# Patient Record
Sex: MALE | Race: WHITE | NOT HISPANIC OR LATINO | Employment: FULL TIME | URBAN - NONMETROPOLITAN AREA
[De-identification: names, ages, dates, MRNs, and addresses within clinical notes are randomized per-mention and may not be internally consistent; named-entity substitution may affect disease eponyms.]

---

## 2024-06-26 ENCOUNTER — APPOINTMENT (OUTPATIENT)
Dept: SLEEP MEDICINE | Facility: CLINIC | Age: 64
End: 2024-06-26
Payer: COMMERCIAL

## 2024-06-27 ENCOUNTER — OFFICE VISIT (OUTPATIENT)
Dept: SLEEP MEDICINE | Facility: CLINIC | Age: 64
End: 2024-06-27
Payer: COMMERCIAL

## 2024-06-27 VITALS
WEIGHT: 257.3 LBS | DIASTOLIC BLOOD PRESSURE: 74 MMHG | BODY MASS INDEX: 36.84 KG/M2 | HEIGHT: 70 IN | SYSTOLIC BLOOD PRESSURE: 122 MMHG | HEART RATE: 78 BPM | OXYGEN SATURATION: 98 %

## 2024-06-27 DIAGNOSIS — G47.33 OSA (OBSTRUCTIVE SLEEP APNEA): Primary | ICD-10-CM

## 2024-06-27 DIAGNOSIS — G25.81 RESTLESS LEG SYNDROME: ICD-10-CM

## 2024-06-27 DIAGNOSIS — E66.09 CLASS 2 OBESITY DUE TO EXCESS CALORIES WITH BODY MASS INDEX (BMI) OF 36.0 TO 36.9 IN ADULT, UNSPECIFIED WHETHER SERIOUS COMORBIDITY PRESENT: ICD-10-CM

## 2024-06-27 PROCEDURE — 3008F BODY MASS INDEX DOCD: CPT | Performed by: PSYCHIATRY & NEUROLOGY

## 2024-06-27 PROCEDURE — 1036F TOBACCO NON-USER: CPT | Performed by: PSYCHIATRY & NEUROLOGY

## 2024-06-27 PROCEDURE — 99204 OFFICE O/P NEW MOD 45 MIN: CPT | Performed by: PSYCHIATRY & NEUROLOGY

## 2024-06-27 RX ORDER — LEVOTHYROXINE SODIUM 200 UG/1
200 TABLET ORAL DAILY
COMMUNITY

## 2024-06-27 RX ORDER — BUPROPION HYDROCHLORIDE 150 MG/1
150 TABLET ORAL EVERY MORNING
COMMUNITY
Start: 2024-06-24 | End: 2024-09-22

## 2024-06-27 RX ORDER — METOPROLOL TARTRATE 25 MG/1
25 TABLET, FILM COATED ORAL 2 TIMES DAILY
COMMUNITY
Start: 2024-03-18

## 2024-06-27 RX ORDER — METOLAZONE 5 MG/1
5 TABLET ORAL DAILY
COMMUNITY

## 2024-06-27 RX ORDER — PRAMIPEXOLE DIHYDROCHLORIDE 1 MG/1
2 TABLET ORAL NIGHTLY
COMMUNITY

## 2024-06-27 RX ORDER — METFORMIN HYDROCHLORIDE 500 MG/1
500 TABLET ORAL DAILY
COMMUNITY

## 2024-06-27 RX ORDER — OMEPRAZOLE 20 MG/1
20 CAPSULE, DELAYED RELEASE ORAL
COMMUNITY
Start: 2024-03-18

## 2024-06-27 RX ORDER — TADALAFIL 5 MG/1
5 TABLET ORAL DAILY
COMMUNITY

## 2024-06-27 RX ORDER — FUROSEMIDE 20 MG/1
20 TABLET ORAL
COMMUNITY
Start: 2024-05-07 | End: 2025-05-07

## 2024-06-27 RX ORDER — DEXTROAMPHETAMINE SACCHARATE, AMPHETAMINE ASPARTATE, DEXTROAMPHETAMINE SULFATE AND AMPHETAMINE SULFATE 5; 5; 5; 5 MG/1; MG/1; MG/1; MG/1
20 TABLET ORAL
COMMUNITY
Start: 2024-06-24 | End: 2024-07-24

## 2024-06-27 NOTE — PROGRESS NOTES
Patient: Todd Pearce    [id number]  : 1960 -- AGE 64 y.o.    Provider: Maurice Hua MD     [facility]   Service Date: 2024              [facility]  New Visit Note        The patient's referring provider is: No ref. provider found    HPI:  Todd Pearce is a 64 y.o. male with GRZEGORZ and RLS with PMH notable for GERD, hypothyroidism, depression, and anxiety, who presents today due to waking up in the night despite using CPAP.    He has had CPAP for about 2-3 months (Symone G3 device), but not in the last few weeks. Wakes up at baseline several times per night and does not stay asleep any better with CPAP. He is frustrated by CPAP because he is bothered by having to fiddle with the machine to reset the machine in the middle of the night, though no pressure intolerance. He is not intolerant of it, but is frustrated due to expecting to be able to sleep through the night with CPAP.    Wakes up tired. Loud snoring for years. Sleep is not restorative.    Using a hybrid full face mask (AirFit F30i medium), was sent a few sizes, and was sent a few nasal style masks. Find his current mask to be tolerable.    Sometimes has too much mist in the mask.    Asks about CPAP therapy alternatives. Willing to continue to practice using CPAP.    Informed today that to qualify for Inspire device he would need to get his weight down to 223 lbs to have a BMI at 32.     Taking Adderall 10 mg daily, which he states is for ADHD - it helps him organize his thoughts. Not taking for alerting effects. Has been on it since in his 40s.    NIGHTTIME SYMPTOMS:   Snoring: many years, loud, nightly, worse when supine, worsening over time and worsening with weight gain  Witnessed apnea: 15 years ago  Nocturnal gasping: No  Nocturnal choking: No  Sleep walking: No  Sleep talking:  No  Dream enactment: No  Nocturnal GERD: no  Morning headaches: no  Morning dry mouth/sore throat:  "occasional  Nocturia: 1-2x/night  Restless sleep: Yes  Sleep paralysis: No  Hypnagogic/hypnopompic hallucinations: No    DAYTIME SYMPTOMS  Elliott: 11/24  Insomnia severity index: 18/28  Sleepy upon waking in the morning    RLS symptoms: yes, feels antsy feeling in his legs without his medication, uncomfortable urge to move his legs, only at night, impairs ability to fall asleep (\"terrible\" without med), treated well with pramipexole 2 mg at bedtime, taking this for over 20 years. Takes hot bath at night to help with RLS. No anemia.    Cataplexy: No    SLEEP HABITS:   Bedtime: 10 pm  Wake time: 530 am    PRIOR SLEEP STUDIES:  PSG through [facility], 3/19/2024: Weight 117 kg, BMI 38.2.  Severe GRZEGORZ with AHI4% of 82.3/h due to 31 obstructive apneas and 361 hypopneas.  Mean SpO2 93%, russell 67% with 48.6 minutes below 90% and 40.1 minutes below 88%.  Wake supine ETCO2 43-44 mmHg, max 55 mmHg with 2.5% of sleep time above 50 and 0% above 55.  0 PLMS.  Patient met split criteria but was unable to tolerate PAP therapy and the remainder of testing was run as a diagnostic PSG.  -Report reviewed person by me today.     Reviewed sleep apnea severity with patient today.      Past Surgical History:   Procedure Laterality Date    INCISION AND DRAINAGE OF WOUND  11/27/2017    Incision And Drainage Of Skin Abscess    OTHER SURGICAL HISTORY  11/27/2017    Shoulder Surgery Left    SHOULDER SURGERY  11/27/2017    Shoulder Surgery Right     Current Outpatient Medications   Medication Sig Dispense Refill    amphetamine-dextroamphetamine (Adderall) 20 mg tablet Take 1 tablet (20 mg) by mouth once daily. Taking 10 mg daily      buPROPion XL (Wellbutrin XL) 150 mg 24 hr tablet Take 1 tablet (150 mg) by mouth once daily in the morning.      furosemide (Lasix) 20 mg tablet Take 1 tablet (20 mg) by mouth once daily.      levothyroxine (Synthroid, Levoxyl) 200 mcg tablet Take 1 tablet (200 mcg) by mouth once daily.      metFORMIN " "(Glucophage) 500 mg tablet Take 1 tablet (500 mg) by mouth once daily.      metOLazone (Zaroxolyn) 5 mg tablet Take 1 tablet (5 mg) by mouth once daily.      metoprolol tartrate (Lopressor) 25 mg tablet Take 1 tablet (25 mg) by mouth twice a day. Taking just 25 mg once daily      omeprazole (PriLOSEC) 20 mg DR capsule Take 1 capsule (20 mg) by mouth once daily.      pramipexole (Mirapex) 1 mg tablet Take 2 tablets (2 mg) by mouth once daily at bedtime.      tadalafil (Cialis) 5 mg tablet Take 1 tablet (5 mg) by mouth once daily.      tirzepatide, weight loss, (Zepbound) 2.5 mg/0.5 mL injection Inject 2.5 mg under the skin once a week.       No current facility-administered medications for this visit.     No Known Allergies    FAMILY HISTORY OF SLEEP DISORDERS: none that he knows of, but father may have had RLS      SOCIAL HISTORY  Employment: teacher  Lives with: wife  Alcohol: occasional beer  Cigarettes: no  Caffeine: 1/day     ROS: 12 point ROS positive for fatigue, wheezing, weight gain, and anxiety.  All other items/systems were reviewed and are negative.    PHYSICAL EXAMINATION:   Vitals:    06/27/24 0833   BP: 122/74   BP Location: Left arm   Patient Position: Sitting   BP Cuff Size: Adult   Pulse: 78   SpO2: 98%   Weight: 117 kg (257 lb 4.8 oz)   Height: 1.778 m (5' 10\")     Body mass index is 36.92 kg/m².  General: Awake. Alert. Comfortable. No apparent distress.   Speech: Normal  Comprehension: Normal  Mood: Stable  Affect: Appropriate  Eyes:   Eyelids: normal            ENT:          Nares  patent bilaterally. Septum deviation absent. Cunningham tongue position I. Tongue scalloping is not present, tongue is not enlarged, soft palate is not elongated, hard palate is not high arched. Uvula is not enlarged. Retrognathia is not present. Tonsils are not enlarged. Dentition very good.           Neck:          Circumference: moderately increased in caliber  Cardiac: Regular in rate and rhythm. No murmurs. No edema " in bilateral lower extremities.  Pul:         Clear to auscultation bilaterally. Normal respiratory effort   Abd:         obese  Neuro: Alert, well-oriented. Cranial nerves II-XII grossly normal and symmetric.  Moves all limbs symmetrically with no evidence of significant focal weakness. No abnormal movements noted. Normal gait      CPAP download:  DME: unknown  Date range: last 90 days  Days used: 17 days  Days used >4 hours: 0 days  Average usage (days used): 57 min  Settin-10 cm H2O  Pressure: 95th %ile 8.5 cm H2O, median 7.0 cm H2O  Leak: 11.5 L/min (95th %ile)  AHI: 3.0      LABS/DIAGNOSTICS:  Lab Results   Component Value Date    HGBA1C 5.0 2024   Labs from 24 include: Hemoglobin 16, creatinine 1.08, bicarb 23, TSH 1.5  Labs from 2024 include: Total testosterone 471 ng/dL, free testosterone 11.3 ng/dL      Echo 2023: Technically difficult due to body habitus.  Mild concentric LVH.  LV systolic function normal at 60±5% by visual estimation.  Grade 1 LV diastolic dysfunction    PFTs 2023: Normal spirometry.  No significant bronchodilator response.      ASSESSMENT AND PLAN: Mr. Todd Pearce is a 64 y.o. male with a history of GRZEGORZ on CPAP and RLS on pramipexole.  He is tolerating CPAP without issue, but he takes off his CPAP after approximate 1 hour of use when he wakes up in the night, though he is not bothered by CPAP.  RLS well-controlled with pramipexole.     #GRZEGORZ  -We discussed the risk factors for sleep apnea, pathophysiology of sleep apnea, treatment options, and potential long-term complications of untreated GRZEGORZ, including cardiovascular and metabolic complications.  -in office today we changed his pressure range to 4-15 cm h2O and lowered humidity setting to 3 from 4  -Informed patient that it is not uncommon to have occasional nocturnal awakenings with or without CPAP, particularly in his age group.  Advised patient to use CPAP all night, every night to get optimal  benefit    #RLS  -continue pramipexole 2 mg at bedtime, advised 1-2 hours before onset of symptoms  -check fasting iron/ferritin, check vit D    #obesity  -weight loss encouraged      All of the above was discussed with the patient in detail. He voiced an understanding of the above and was agreeable to proceed further as advised.     Around 53 minutes were spent on this encounter, including time reviewing the chart, conducting the H&P, counseling the patient, and documenting/placing orders.    FOLLOW UP:  1 month

## 2024-07-29 ENCOUNTER — OFFICE VISIT (OUTPATIENT)
Dept: SLEEP MEDICINE | Facility: CLINIC | Age: 64
End: 2024-07-29
Payer: COMMERCIAL

## 2024-07-29 VITALS
BODY MASS INDEX: 37.06 KG/M2 | WEIGHT: 258.9 LBS | HEART RATE: 86 BPM | SYSTOLIC BLOOD PRESSURE: 122 MMHG | DIASTOLIC BLOOD PRESSURE: 86 MMHG | HEIGHT: 70 IN | OXYGEN SATURATION: 97 %

## 2024-07-29 DIAGNOSIS — E66.09 CLASS 2 OBESITY DUE TO EXCESS CALORIES WITH BODY MASS INDEX (BMI) OF 36.0 TO 36.9 IN ADULT, UNSPECIFIED WHETHER SERIOUS COMORBIDITY PRESENT: ICD-10-CM

## 2024-07-29 DIAGNOSIS — G47.33 OSA (OBSTRUCTIVE SLEEP APNEA): Primary | ICD-10-CM

## 2024-07-29 DIAGNOSIS — G25.81 RESTLESS LEG SYNDROME: ICD-10-CM

## 2024-07-29 PROCEDURE — 3008F BODY MASS INDEX DOCD: CPT | Performed by: PSYCHIATRY & NEUROLOGY

## 2024-07-29 PROCEDURE — 99214 OFFICE O/P EST MOD 30 MIN: CPT | Performed by: PSYCHIATRY & NEUROLOGY

## 2024-07-29 PROCEDURE — 1036F TOBACCO NON-USER: CPT | Performed by: PSYCHIATRY & NEUROLOGY

## 2024-07-29 NOTE — PROGRESS NOTES
Patient: Todd Pearce    [id number]  : 1960 -- AGE 64 y.o.    Provider: Maurice Hua MD     [facility]   Service Date: 2024              [facility]  Follow-up Note        HPI: Todd Pearce is a 64 y.o. male with GRZEGORZ on CPAP and RLS on pramipexole with PMH notable for GERD, hypothyroidism, depression, and anxiety, who presented on 24 due to waking up in the night despite using CPAP after having started on PAP therapy a couple months prior, though he was not bothered by CPAP itself, but nightly average usage was only 57 minutes with residual AHI 3.0. He is here today for a follow up visit.     Continues to struggle with CPAP, but has only used it once, per the machine. Tried to use it while watching TV. Cannot stay asleep with CPAP. Wakes up after 45 min to an hour with CPAP. Feels the machine's air pressure changing when he moves in bed. He is uncomfortable in bed due to sciatica.    Nocturia about 2x/night.    Looking at alternatives to CPAP. Knows he needs to treat his GRZEGORZ. Willing to try again more diligently, but also wants to try the ULTepap nasal EPAP device. Tried on this device in clinic today and found it comfortable.    Trying to lose weight.     He has not yet gone to get his RLS labs drawn.    Continues to be tired in the daytime. Needs to be feeling less tired before school starts up again.    PAP DEVICE   Type: Symone 3G autoCPAP    MASK  Type: has both N30i and F3Oi, usually using the F30i    Prior Sleep studies:   PSG through [facility], 3/19/2024: Weight 117 kg, BMI 38.2.  Severe GRZEGORZ with AHI4% of 82.3/h due to 31 obstructive apneas and 361 hypopneas.  Mean SpO2 93%, russell 67% with 48.6 minutes below 90% and 40.1 minutes below 88%.  Wake supine ETCO2 43-44 mmHg, max 55 mmHg with 2.5% of sleep time above 50 and 0% above 55.  0 PLMS.  Patient met split criteria but was unable to tolerate PAP therapy and the remainder of  testing was run as a diagnostic PSG.               REVIEW OF MACHINE DOWNLOAD:   Date Range: last 30 days  % Days used: =3%  % Days with Usage >= 4 hrs: 0%  Average usage days used: 34 min   Settin-15 cmH2O  95th percentile pressure: 4.0 cmH2O, median pressure 4.0 cm H2O  Avg leak: 6.9 LPM  Residual AHI: 0.0      Past Surgical History:   Procedure Laterality Date    INCISION AND DRAINAGE OF WOUND  2017    Incision And Drainage Of Skin Abscess    KNEE SURGERY      OTHER SURGICAL HISTORY  2017    Shoulder Surgery Left    SHOULDER SURGERY  2017    Shoulder Surgery Right     Current Outpatient Medications on File Prior to Visit   Medication Sig Dispense Refill    amphetamine-dextroamphetamine (Adderall) 20 mg tablet Take 1 tablet (20 mg) by mouth once daily. Taking 10 mg daily      buPROPion XL (Wellbutrin XL) 150 mg 24 hr tablet Take 1 tablet (150 mg) by mouth once daily in the morning.      furosemide (Lasix) 20 mg tablet Take 1 tablet (20 mg) by mouth once daily.      levothyroxine (Synthroid, Levoxyl) 200 mcg tablet Take 1 tablet (200 mcg) by mouth once daily.      metFORMIN (Glucophage) 500 mg tablet Take 1 tablet (500 mg) by mouth once daily.      metOLazone (Zaroxolyn) 5 mg tablet Take 1 tablet (5 mg) by mouth once daily.      metoprolol tartrate (Lopressor) 25 mg tablet Take 1 tablet (25 mg) by mouth twice a day. Taking just 25 mg once daily      omeprazole (PriLOSEC) 20 mg DR capsule Take 1 capsule (20 mg) by mouth once daily.      pramipexole (Mirapex) 1 mg tablet Take 2 tablets (2 mg) by mouth once daily at bedtime.      tadalafil (Cialis) 5 mg tablet Take 1 tablet (5 mg) by mouth once daily.      tirzepatide, weight loss, (Zepbound) 2.5 mg/0.5 mL injection Inject 2.5 mg under the skin once a week.       No current facility-administered medications on file prior to visit.       PHYSICAL EXAMINATION:   Vitals:    24 1035   BP: 122/86   BP Location: Left arm   Patient Position:  "Sitting   BP Cuff Size: Adult   Pulse: 86   SpO2: 97%   Weight: 117 kg (258 lb 14.4 oz)   Height: 1.778 m (5' 10\")     Body mass index is 37.15 kg/m².   General: Awake. Alert. Comfortable. No apparent distress.   Speech: Normal.  Comprehension: Normal.  Mood: Stable.  Affect: Appropriate.  Pul:         Normal respiratory effort.   Abd:         Obese  Neuro: Alert, well-oriented. Cranial nerves II-XII grossly normal and symmetric.  Moves all limbs symmetrically with no evidence of significant focal weakness. No abnormal movements noted. Normal gait      ASSESSMENT AND PLAN: Mr. Todd Pearce is a 64 y.o. male with GRZEGORZ having difficulty keeping CPAP on all night with RLS (well-managed with pramipexole) and obesity. He is motivated to learn to acclimate to CPAP.      #GRZEGORZ  -pt motivated to try CPAP more diligently  -encouraged pt to use CPAP all night, every night. Advised pt that his GRZEGORZ is well treated with CPAP  -pt to consider the ULTepap device if he does not end up tolerating CPAP after a few more weeks of trying CPAP    #RLS  -pt was advised to get his blood work done - fasting iron/ferritin/vit D    #obesity  -pt working on weight loss    All of the above was discussed with the patient in detail. He voiced an understanding of the above and was agreeable to proceed further as advised.     32 minutes were spent with the patient plus time spent reviewing the chart, updating the chart as needed, and documenting.     FOLLOW UP: 2 weeks, per pt request  "

## 2024-07-30 ENCOUNTER — TELEPHONE (OUTPATIENT)
Dept: SLEEP MEDICINE | Facility: CLINIC | Age: 64
End: 2024-07-30
Payer: COMMERCIAL

## 2024-07-30 NOTE — TELEPHONE ENCOUNTER
Called pt back. He continues to struggle with CPAP. Last night could not fall asleep with it or stay asleep. Pressures were too low and too high. Took it off shortly after falling asleep and wife moved to other room due to his loud snoring.    We will have him try the nasal EPAP device, ULTepap.

## 2024-07-31 ENCOUNTER — APPOINTMENT (OUTPATIENT)
Dept: SLEEP MEDICINE | Facility: CLINIC | Age: 64
End: 2024-07-31
Payer: COMMERCIAL

## 2024-08-12 ENCOUNTER — APPOINTMENT (OUTPATIENT)
Dept: SLEEP MEDICINE | Facility: CLINIC | Age: 64
End: 2024-08-12
Payer: COMMERCIAL

## 2024-08-12 DIAGNOSIS — G25.81 RESTLESS LEG SYNDROME: ICD-10-CM

## 2024-08-12 DIAGNOSIS — G47.33 OSA (OBSTRUCTIVE SLEEP APNEA): Primary | ICD-10-CM

## 2024-08-12 DIAGNOSIS — E66.09 CLASS 2 OBESITY DUE TO EXCESS CALORIES WITH BODY MASS INDEX (BMI) OF 36.0 TO 36.9 IN ADULT, UNSPECIFIED WHETHER SERIOUS COMORBIDITY PRESENT: ICD-10-CM

## 2024-08-12 PROCEDURE — 99213 OFFICE O/P EST LOW 20 MIN: CPT | Performed by: PSYCHIATRY & NEUROLOGY

## 2024-08-12 NOTE — PROGRESS NOTES
Patient: Todd Pearce    [id number]  : 1960 -- AGE 64 y.o.    Provider: Maurice Hua MD     [facility]   Service Date: 2024              [facility]  Follow-up Note      Virtual or Telephone Consent  An interactive audio and video telecommunication system which permits real time communications between the patient (at the originating site) and provider (at the distant site) was utilized to provide this telehealth service.   Verbal consent was requested and obtained from Todd Pearce on this date, 24 for a telehealth visit.   I verified the patient's identity and physical location in [place].  If this is a new patient to me, I informed the patient of my name and type of active [place] license that I hold.      HPI: Todd Pearce is a 64 y.o. male with GRZEGORZ on CPAP and RLS managed with pramipexole with PMH notable for GERD, hypothyroidism, depression, and anxiety, who presented on 24 due to waking up in the night despite using CPAP after having started on PAP therapy a couple months prior, though he was not bothered by CPAP itself, but nightly average usage was only 57 minutes with residual AHI 3.0. He is here today for a follow up visit. He was last seen on 24, at which time he had interest in the ULTepap nasal EPAP device.    He called after the appointment to have us prescribe the ULTepap device, which we did. Today, he states that he really likes it. Using the larger size nasal cushions. Not able to sleep the whole night with it - often takes it off when he is dreaming and his breathing pattern changes. Typically getting around 3 hours of usage/night.    Getting up at night only about once/night. Sleeping better due to ULTepap device. Snoring is reduced.     Got a new pillow that keeps him from moving his head a lot like with his prior pillow, which helps keep his nasal device in place.    Seeing endo next week for weight loss  management. Not using tirzepatide.    Not using CPAP right now.    PAP DEVICE   Type: Symone 3G autoCPAP     RLS: starting to get more bothersome affecting his sleep. Has been taking pramipexole 2 mg nightly. Did not go to get his iron/vit D labs drawn.      Prior Sleep studies:   PSG through [facility], 3/19/2024: Weight 117 kg, BMI 38.2. Severe GRZEGORZ with AHI4% of 82.3/h due to 31 obstructive apneas and 361 hypopneas. Mean SpO2 93%, russell 67% with 48.6 minutes below 90% and 40.1 minutes below 88%. Wake supine ETCO2 43-44 mmHg, max 55 mmHg with 2.5% of sleep time above 50 and 0% above 55. 0 PLMS. Patient met split criteria but was unable to tolerate PAP therapy and the remainder of testing was run as a diagnostic PSG.             There is no problem list on file for this patient.    No past medical history on file.  Past Surgical History:   Procedure Laterality Date    INCISION AND DRAINAGE OF WOUND  11/27/2017    Incision And Drainage Of Skin Abscess    KNEE SURGERY      OTHER SURGICAL HISTORY  11/27/2017    Shoulder Surgery Left    SHOULDER SURGERY  11/27/2017    Shoulder Surgery Right     Current Outpatient Medications on File Prior to Visit   Medication Sig Dispense Refill    amphetamine-dextroamphetamine (Adderall) 20 mg tablet Take 1 tablet (20 mg) by mouth once daily. Taking 10 mg daily      buPROPion XL (Wellbutrin XL) 150 mg 24 hr tablet Take 1 tablet (150 mg) by mouth once daily in the morning.      furosemide (Lasix) 20 mg tablet Take 1 tablet (20 mg) by mouth once daily.      levothyroxine (Synthroid, Levoxyl) 200 mcg tablet Take 1 tablet (200 mcg) by mouth once daily.      metFORMIN (Glucophage) 500 mg tablet Take 1 tablet (500 mg) by mouth once daily.      metOLazone (Zaroxolyn) 5 mg tablet Take 1 tablet (5 mg) by mouth once daily.      metoprolol tartrate (Lopressor) 25 mg tablet Take 1 tablet (25 mg) by mouth twice a day. Taking just 25 mg once daily      omeprazole (PriLOSEC) 20 mg DR capsule  "Take 1 capsule (20 mg) by mouth once daily.      pramipexole (Mirapex) 1 mg tablet Take 2 tablets (2 mg) by mouth once daily at bedtime.      tadalafil (Cialis) 5 mg tablet Take 1 tablet (5 mg) by mouth once daily.      tirzepatide, weight loss, (Zepbound) 2.5 mg/0.5 mL injection Inject 2.5 mg under the skin once a week.       No current facility-administered medications on file prior to visit.       PHYSICAL EXAMINATION:   General: Awake. Alert. Comfortable. No apparent distress.   Speech: Normal.  Comprehension: Normal.  Mood: Stable.  Affect: Appropriate.  Pul:         Normal respiratory effort.   Neuro: Alert, well-oriented. Cranial nerves II-XII grossly normal and symmetric.      ASSESSMENT AND PLAN: Mr. Todd Pearce is a 64 y.o. male with severe GRZEGORZ who does not tolerate CPAP and he is adjusting to sleeping with the nasal EPAP device \"ULTepap\". We had limited time to discuss his RLS, which is not fully controlled. He has not gone to get his lab work drawn that I previously ordered to work-up his RLS.    #GRZEGORZ  -HST to be done with nasal EPAP device, ULTepap  - to assess therapeutic effectiveness    #RLS  -pt to get fasting iron/vit D levels drawn  -pt taking pramipexole 2 mg at bedtime  -did not have sufficient time today to thoroughly discuss his RLS     #obesity  -weight loss encouraged; pt working with endocrinology    All of the above was discussed with the patient in detail. He voiced an understanding of the above and was agreeable to proceed further as advised.     23 minutes were spent with the patient plus time spent reviewing the chart, updating the chart as needed, and documenting.     FOLLOW UP: after sleep study  "

## 2024-08-16 ENCOUNTER — LAB (OUTPATIENT)
Dept: LAB | Facility: LAB | Age: 64
End: 2024-08-16
Payer: COMMERCIAL

## 2024-08-16 DIAGNOSIS — G25.81 RESTLESS LEG SYNDROME: ICD-10-CM

## 2024-08-16 LAB
25(OH)D3 SERPL-MCNC: 52 NG/ML (ref 30–100)
FERRITIN SERPL-MCNC: 106 NG/ML (ref 20–300)
IRON SATN MFR SERPL: 33 % (ref 25–45)
IRON SERPL-MCNC: 106 UG/DL (ref 35–150)
TIBC SERPL-MCNC: 324 UG/DL (ref 240–445)
UIBC SERPL-MCNC: 218 UG/DL (ref 110–370)

## 2024-08-16 PROCEDURE — 82306 VITAMIN D 25 HYDROXY: CPT

## 2024-08-16 PROCEDURE — 82728 ASSAY OF FERRITIN: CPT

## 2024-08-16 PROCEDURE — 36415 COLL VENOUS BLD VENIPUNCTURE: CPT

## 2024-08-16 PROCEDURE — 83540 ASSAY OF IRON: CPT

## 2024-08-16 PROCEDURE — 83550 IRON BINDING TEST: CPT

## 2024-09-16 ENCOUNTER — TELEPHONE (OUTPATIENT)
Dept: SLEEP MEDICINE | Facility: HOSPITAL | Age: 64
End: 2024-09-16
Payer: COMMERCIAL

## 2024-09-16 NOTE — TELEPHONE ENCOUNTER
Patient was contacted by [facility] to have home sleep study shipped to his home. Patient has informed them that he is going another directions and wants to cancel the order.     Please be advised.     Michelle

## 2024-09-18 ENCOUNTER — HOSPITAL ENCOUNTER (OUTPATIENT)
Dept: CARDIOLOGY | Facility: HOSPITAL | Age: 64
Discharge: HOME | End: 2024-09-18
Payer: COMMERCIAL

## 2024-09-18 DIAGNOSIS — I48.91 ATRIAL FIBRILLATION, UNSPECIFIED TYPE (MULTI): ICD-10-CM

## 2024-09-18 PROCEDURE — 93246 EXT ECG>7D<15D RECORDING: CPT

## 2024-09-19 ENCOUNTER — APPOINTMENT (OUTPATIENT)
Dept: CARDIOLOGY | Facility: HOSPITAL | Age: 64
End: 2024-09-19
Payer: COMMERCIAL

## 2024-09-27 ENCOUNTER — TELEPHONE (OUTPATIENT)
Dept: SLEEP MEDICINE | Facility: CLINIC | Age: 64
End: 2024-09-27
Payer: COMMERCIAL

## 2024-09-27 NOTE — TELEPHONE ENCOUNTER
I'm assuming they are just referral forms for us to sign, so please have them fax over the forms and we can send them back. Thanks!

## 2024-10-22 DIAGNOSIS — G47.33 OSA (OBSTRUCTIVE SLEEP APNEA): Primary | ICD-10-CM

## 2024-10-30 ENCOUNTER — APPOINTMENT (OUTPATIENT)
Dept: UROLOGY | Facility: CLINIC | Age: 64
End: 2024-10-30
Payer: COMMERCIAL

## 2024-10-30 VITALS — TEMPERATURE: 97.1 F

## 2024-10-30 DIAGNOSIS — N52.9 ERECTILE DYSFUNCTION, UNSPECIFIED ERECTILE DYSFUNCTION TYPE: ICD-10-CM

## 2024-10-30 PROCEDURE — 99204 OFFICE O/P NEW MOD 45 MIN: CPT | Performed by: UROLOGY

## 2024-10-30 PROCEDURE — 1036F TOBACCO NON-USER: CPT | Performed by: UROLOGY

## 2024-10-30 RX ORDER — METHYLPREDNISOLONE 4 MG/1
TABLET ORAL
COMMUNITY
Start: 2024-08-26

## 2024-10-30 RX ORDER — GABAPENTIN 300 MG/1
CAPSULE ORAL
COMMUNITY
Start: 2024-10-24

## 2024-10-30 RX ORDER — MELOXICAM 15 MG/1
15 TABLET ORAL
COMMUNITY
Start: 2024-09-04

## 2024-10-30 RX ORDER — VIT C/E/ZN/COPPR/LUTEIN/ZEAXAN 250MG-90MG
1 CAPSULE ORAL
COMMUNITY
Start: 2024-03-19

## 2024-10-30 ASSESSMENT — PAIN SCALES - GENERAL: PAINLEVEL_OUTOF10: 0-NO PAIN

## 2024-11-11 NOTE — PROGRESS NOTES
Procedure:  Intracavernosal injection   Penile ultrasound/gray scale   Penile duplex Doppler ultrasound     Indication: Erectile dysfunction refractory to PDE5 inhibitors    Penile Ultrasound: Morphologic and gray scale evaluation of the penis     1.- Tunical integrity:    Normal: smooth, continuous with thickness < 2 mm     2.- Vascular integrity:     Grade 2: smooth vessel wall, but with short interruptions     3.- Erectile tissue integrity:   Calcified lesions: coalesced hyperechoic with back shadowing     Medication:     15 units of mixture # 5      Penile rigidity:  25%   Penile curvature: none   Phenylephrine: none     Penile duplex Doppler ultrasound:             Right   Left   Cavernosal artery diameters:     0.59 mm                0.7 mm   Peak systolic velocities:  23.2 cm/sec   23.2 cm/sec    End diastolic velocities:  7.42 cm/sec   8.02 cm/sec       Impression: Severe erectile dysfunction secondary to arterial insufficiency and corporo veno-occlusive dysfunction refractory to medical management with PDE's (Viagra, Cialis) .      Plan:     Intracavernosal therapy. The patient was instructed how to self inject inject intracavernosal therapy and instructed to start injecting 10 units of mixture 9. He may titrate the dose up or down by 5 units to maximize penile rigidity and duration of erection.      The patient was also counseled extensively that today or while on injections If he develops a priapism / erection over 4 hours he was ask to return to the office or to the emergency room immediately. Educational material was provided and all his questions and concerns were addressed.

## 2024-11-11 NOTE — PROGRESS NOTES
"HPI:  64 y.o. yo male patient complains of erectile dysfunction    Last Visit:  Will schedule intracavernosal injection and penile duplex ultrasound.     Fu for Doppler    Today's Visit  - Doppler completed today (see separate note)   -Injection teaching done today  - Penile rigidity - 5/10  - Taking Cialis 5mg daily   - Interested in intracavernosal injection    -cardiology and PCP note reviewed    Labs:  Testosterone, Free 2/25/24: 11.3, total 471  CBC: 4/8/24  Component  Ref Range & Units 6 mo ago   WBC  3.70 - 11.00 k/uL 9.36   RBC  4.20 - 6.00 m/uL 5.12   Hemoglobin  13.0 - 17.0 g/dL 16.0   Hematocrit  39.0 - 51.0 % 47.4   MCV  80.0 - 100.0 fL 92.6   MCH  26.0 - 34.0 pg 31.3   MCHC  30.5 - 36.0 g/dL 33.8   RDW-CV  11.5 - 15.0 % 12.5   Platelet Count  150 - 400 k/uL 315   MPV  9.0 - 12.7 fL 9.0   Neutrophils %  % 64.1   Abs Neut  1.45 - 7.50 k/uL 6.00   Lymphocytes %  % 23.9   Abs Lymph  1.00 - 4.00 k/uL 2.24   Monocytes %  % 10.3   Abs Mono  <0.87 k/uL 0.96 High    Eosinophils %  % 1.2   Abs Eosin  <0.46 k/uL 0.11   Basophils %  % 0.2   Abs Baso  <0.11 k/uL <0.03   Immature Granulocytes %  % 0.3   Abs Immature Gran  <0.10 k/uL 0.03   NRBC  /100 WBC 0.0   Absolute nRBC  <0.01 k/uL <0.01     No results found for: \"PROLACTIN\"  Lab Results   Component Value Date    HGBA1C 5.0 04/08/2024     CMP: 4/8/24  Component  Ref Range & Units 6 mo ago   Protein, Total  6.3 - 8.0 g/dL 7.6   Albumin  3.9 - 4.9 g/dL 4.3   Calcium, Total  8.5 - 10.2 mg/dL 9.0   Bilirubin, Total  0.2 - 1.3 mg/dL 0.6   Alkaline Phosphatase  38 - 113 U/L 86   AST  14 - 40 U/L 21   ALT  10 - 54 U/L 21   Glucose  74 - 99 mg/dL 88     BUN  9 - 24 mg/dL 20   Creatinine  0.73 - 1.22 mg/dL 1.08   Sodium  136 - 144 mmol/L 137   Potassium  3.7 - 5.1 mmol/L 3.7   Chloride  97 - 105 mmol/L 103   CO2  22 - 30 mmol/L 23   Anion Gap  9 - 18 mmol/L 11   Estimated Glomerular Filtration Rate  >=60 mL/min/1.73m² 77      in high school at " [employer]    PMH:  No past medical history on file.     PSH:  Past Surgical History:   Procedure Laterality Date    INCISION AND DRAINAGE OF WOUND  11/27/2017    Incision And Drainage Of Skin Abscess    KNEE SURGERY      OTHER SURGICAL HISTORY  11/27/2017    Shoulder Surgery Left    SHOULDER SURGERY  11/27/2017    Shoulder Surgery Right        Medications:    Current Outpatient Medications:     amphetamine-dextroamphetamine (Adderall) 20 mg tablet, Take 1 tablet (20 mg) by mouth once daily. Taking 10 mg daily, Disp: , Rfl:     buPROPion XL (Wellbutrin XL) 150 mg 24 hr tablet, Take 1 tablet (150 mg) by mouth once daily in the morning., Disp: , Rfl:     cholecalciferol, vitamin D3, 250 mcg (10,000 unit) capsule, Take 1 capsule (250 mcg) by mouth early in the morning.., Disp: , Rfl:     furosemide (Lasix) 20 mg tablet, Take 1 tablet (20 mg) by mouth once daily., Disp: , Rfl:     gabapentin (Neurontin) 300 mg capsule, TAKE ONE CAPSULE BY MOUTH 1 hour before BEDTIME, Disp: , Rfl:     levothyroxine (Synthroid, Levoxyl) 200 mcg tablet, Take 1 tablet (200 mcg) by mouth once daily., Disp: , Rfl:     meloxicam (Mobic) 15 mg tablet, Take 1 tablet (15 mg) by mouth once daily., Disp: , Rfl:     metFORMIN (Glucophage) 500 mg tablet, Take 1 tablet (500 mg) by mouth once daily., Disp: , Rfl:     methylPREDNISolone (Medrol Dospak) 4 mg tablets, TAKE BY MOUTH AS INSTRUCTED PER PACKAGE, Disp: , Rfl:     metOLazone (Zaroxolyn) 5 mg tablet, Take 1 tablet (5 mg) by mouth once daily., Disp: , Rfl:     metoprolol tartrate (Lopressor) 25 mg tablet, Take 1 tablet (25 mg) by mouth twice a day. Taking just 25 mg once daily, Disp: , Rfl:     omeprazole (PriLOSEC) 20 mg DR capsule, Take 1 capsule (20 mg) by mouth once daily., Disp: , Rfl:     pramipexole (Mirapex) 1 mg tablet, Take 2 tablets (2 mg) by mouth once daily at bedtime., Disp: , Rfl:     tadalafil (Cialis) 5 mg tablet, Take 1 tablet (5 mg) by mouth once daily., Disp: , Rfl:      tirzepatide, weight loss, (Zepbound) 2.5 mg/0.5 mL injection, Inject 2.5 mg under the skin once a week., Disp: , Rfl:     Allergy:  No Known Allergies     Exam  Testicles descended bilaterally, nontender, no masses  Vasa palpable bilaterally  Penis circ'd, no lesions, no plaques    Assessment/Plan  #Erectile Dysfunction: I discussed the etiology and different treatment modalities for erectile dysfunction. Specifically, we talked about lifestyle factors like smoking, diet, and exercise. We also discussed ED as a sign of systemic disease, and the contributions of elevated cholesterol and elevated blood sugars on erections. We then discussed treatment modalities, specifically PDE5 inhibitors, intracavernosal injections, vacuum erectile devices, and penile prostheses.    -start intra cavernosal injections with mix 9 at home, starting at 10 units -   - Doppler completed today  - Continue Cialis 5 mg     Fu in 3 months    Scribe Attestation  By signing my name below, I, Francine Chi   attest that this documentation has been prepared under the direction and in the presence of Delfin Bell MD.    Scribe Attestation  By signing my name below, IElin, Scribosmin   attest that this documentation has been prepared under the direction and in the presence of Delfin Bell MD.

## 2024-11-12 ENCOUNTER — APPOINTMENT (OUTPATIENT)
Dept: UROLOGY | Facility: CLINIC | Age: 64
End: 2024-11-12
Payer: COMMERCIAL

## 2024-11-12 VITALS
HEART RATE: 79 BPM | DIASTOLIC BLOOD PRESSURE: 77 MMHG | BODY MASS INDEX: 38.25 KG/M2 | TEMPERATURE: 97.7 F | SYSTOLIC BLOOD PRESSURE: 116 MMHG | HEIGHT: 70 IN | WEIGHT: 267.2 LBS

## 2024-11-12 DIAGNOSIS — N52.9 ERECTILE DISORDER: Primary | ICD-10-CM

## 2024-11-12 PROCEDURE — 54235 NJX CORPORA CAVERNOSA RX AGT: CPT | Performed by: UROLOGY

## 2024-11-12 PROCEDURE — 93980 PENILE VASCULAR STUDY: CPT | Performed by: UROLOGY

## 2024-11-12 PROCEDURE — 99214 OFFICE O/P EST MOD 30 MIN: CPT | Performed by: UROLOGY

## 2024-11-12 ASSESSMENT — PAIN SCALES - GENERAL: PAINLEVEL_OUTOF10: 0-NO PAIN

## 2025-04-16 NOTE — PROGRESS NOTES
"HISTORY OF PRESENT ILLNESS:  Mr. Pearce is a 64 y.o. male referred by Dr. Peñaloza for a family history of early-onset Alzheimer's/dementia.  He is accompanied to his virtual genetics visit alone.    GENETIC TESTING:  No     MEDICAL CONCERNS:  GERD  Hypothyroid  Paroxysmal atrial fibrillation  Presented to the emergency department February 2024 for abdominal pain and was found to to be in A-fib with RVR   Sleep apnea-- has mouth piece now and it is helping  Osteoarthritis   Tore meniscus on right knee (in 9/2024)  Restless leg syndrome  Anxiety    RECENT SPECIALISTS (within the past couple of years):  8/12/24-- sleep medicine-- seen for GRZEGORZ on CPAP and RLS managed with pramipexole-- does not tolerate CPAP and he is adjusting to sleeping with the nasal EPAP device \"ULTepap\"--- HST to be done with nasal EPAP device, ULTepap  - to assess therapeutic effectiveness-- get fasting iron/vit D levels draw-- taking pramipexole-- weight loss encouraged; pt working with endocrinology-- FUP after sleep study    10/30/24-- urology-- seen for erectile dysfunction-- will schedule intracavernosal injection and penile duplex ultrasound-- FUP after doppler    11/7/24-- cardiology-- hx of atrial fibrillation and sleep apnea-- restart metoprolol succinate-- PSG completed on 3/19/2024 showed severe GRZEGORZ, not using CPAP machine, going to see a dentist for mouth guard-- was referred to endocrinology for medical weight loss, encouraged to increase physical activity using water aerobics and swimming-- discontinue lasix-- RTC 6 months    3/14/25-- orthopedic surgery-- hx of left knee osteoarthritis, complex medial meniscus tear and prepatellar bursitis-- first will treat bursitis-- will place on 2 week course of Naproxen and regular icing. 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 It is unknown if he has had genetic testing.  Mother had borderline personality disorder. She passed away at 82.  Maternal grandfather passed away in his 50's from a fall.    Father was diagnosed with Alzheimer's at 62. He passed away at 72. It is unknown if he has had genetic testing.  Paternal uncle, 79 years old, was diagnosed with Alzheimer's disease in his late-60's.  Paternal aunt, 80's, possibly has Alzheimer's-- she has not been officially diagnosed.  Paternal aunt was diagnosed with cancer around age 49 (type of cancer is unknown). She passed away at 50.  Paternal grandmother passed away in her 80's from a 'white blood cell issue.'  Paternal first cousin had intellectual disability (unable to live alone)-- he passed away in his 40's.  Paternal first cousin, female, passed away before the age of 2 from a 'blood issue.'    Consanguinity and Ashkenazi [place]anda[place] ancestry were denied. The patient's maternal side is of [place] descent, and the patient's paternal side is of [place] descent. The remainder of the family history was negative for intellectual disability, birth defects, miscarriages/stillbirths, blindness, deafness, kidney disease, heart disease, cancer, muscle disease, and blood disorders.     DISCUSSION:  Mr. Pearce is a 64 y.o. male referred by Dr. Peñaloza for a family history of early-onset Alzheimer's/dementia.  We discussed the following:    Genetic evaluation is NOT a tool to diagnose dementia.  Dementia is a term that describes a group of symptoms associated with cognitive and/or behavioral changes that interfere with the ability to perform everyday activities.   Alzheimer's disease (AD) and frontotemporal dementia (FTD) are the two most common types of dementia.     Every person has  a baseline 10-12% risk to develop Alzheimer's during their lifetime.   Factors such as family history and diet, exercise, drinking, and smoking habits can increase that number.    A recent study showed that having any first-degree relative with Alzheimer's significantly increased risk (PMID: 39142740):  >=2 first-degree relatives: RR 3.98   Mr. Vo risk to develop Alzheimer's is most likely higher than RR 3.98 since he also has one (possibly 2) second degree relatives with Alzheimer's.    Early-onset AD (before age 65) accounts for ~5% of all cases of AD.  3 genes are associated with EOAD (early onset AD)  JEANINE, PSEN1, or PSEN2 (PMID: 65092271).   PSEN1 is the most common cause of ADAD (autosomal dominant Alzheimer's disease)  PSEN2  JEANINE  These genes have a high penetrance-- the lifetime risk of Alzheimer's when having a mutation in one of these 3 genes is as high as 100%.   The molecular diagnostic yield has been reported to be about 5-15% of individuals with EOAD.    We discussed Genetic Information Non-discrimination Act (CLARA) as it relates to pre-symptomatic testing for the 3 early-onset AD genes.  CLARA prohibits discrimination by health insurance plans and employers based on one's genetic information.  CLARA does not apply to life, long-term care or disability insurance. These insurers are allowed to use genetic, personal or family health information to make coverage or premium decisions.   Some states have their own genetic protection laws, providing additional security against genetic discrimination for these types of insurance.  In addition, CLARA does not apply to:  Members of the [place]   Veterans obtaining health care through the 's Administration  Individuals using the  Health Service, or  Federal employees enrolled in the Federal Employees Health Benefits program (FEHB)    Given his family history of early-onset Alzheimer's, genetic testing of these three genes is  clinically indicated.  We reviewed the limitations of testing an unaffected individual for Alzheimer's disease.  When testing an individual who has not had a diagnosis of Alzheimer's disease, like Mr. Pearce, a negative test result may have limited utility in defining future risks to develop Alzheimer's, as it is unclear whether the affected family members had a mutation in one of the genes that the unaffected individual did not inherit, or alternatively, whether there is another untested risk factor to explain the family history of Alzheimers that is shared between the unaffected individual and their family members.     We discussed that it would be helpful to see if his dad and/or brother had genetic testing for the EOAD genes.   If they did have testing, and were negative, there would be no reason to test Mr. Pearce.  If they did have testing, and were positive, then single site genetic testing can be pursued for Mr. Pearce.    He is unsure if his father had genetic testing for the EOAD genes. He will request his father's medical records (he was his father's POA).  He will speak to his brother's wife to see if his brother had genetic testing for the EOAD genes.    We discussed that if he wanted to proceed with this genetic testing, that he would need to be cleared by psychiatry.  This is required because the testing process and results (if positive or if an uncertain result is identified) can be stressful.

## 2025-04-17 ENCOUNTER — TELEMEDICINE CLINICAL SUPPORT (OUTPATIENT)
Dept: GENETICS | Facility: CLINIC | Age: 65
End: 2025-04-17
Payer: COMMERCIAL

## 2025-04-17 DIAGNOSIS — Z81.8 FAMILY HISTORY OF PRESENILE DEMENTIA: ICD-10-CM

## 2025-04-17 PROCEDURE — 96041 GENETIC COUNSELING SVC EA 30: CPT | Performed by: GENETIC COUNSELOR, MS

## 2025-04-17 ASSESSMENT — ENCOUNTER SYMPTOMS
NEUROLOGIC COMPLAINT: 1
MEMORY LOSS: 1
FATIGUE: 1

## 2025-04-17 NOTE — PATIENT INSTRUCTIONS
PLAN:  He is unsure if his father had genetic testing for the EOAD genes. He will request his father's medical records (he was his father's POA).  He will speak to his brother's wife to see if his brother had genetic testing for the EOAD genes.  He will call and let me know after he receives his father's medical records and after he speaks to his brother's wife.  Please contact me with any questions.    Total time spent on day of encounter: 70 minutes (40 minutes with patient, 30 minutes on pre/post patient care activities, including documentation).    Nevaeh Stevenson [facility]  Certified Genetic Counselor  [facility]  [phone]    Reviewed by:  Dr. Sumaya Rodriguez  Clinical   [facility]  [phone]

## 2025-04-21 ENCOUNTER — TELEPHONE (OUTPATIENT)
Dept: GENETICS | Facility: CLINIC | Age: 65
End: 2025-04-21
Payer: COMMERCIAL

## 2025-04-21 NOTE — TELEPHONE ENCOUNTER
I returned the patient's call.  He stated that his brother's wife (the brother who was diagnosed with dementia at 56) said that his brother has not had genetic testing.  Mr. Pearce stated that his father passed away ~19 years ago and knows that no genetic testing was ordered from him.    His brother and his wife live in [place]. We discussed that his brother, who was diagnosed with dementia at 56, would be the best person to undergo genetic testing first. If a mutation is found, then we can test Mr. Pearce. If no mutations are found, there would not be a reason to test Mr. Pearce.  Mr. Pearce stated that he will relay this information to his brother's wife and that he will let me know once his brother receives his genetic test results.     Nevaeh Stevenson MS, [place]  Certified Genetic Counselor  [facility]  [phone]

## 2025-04-22 NOTE — PROGRESS NOTES
"Virtual or Telephone Consent    An interactive audio and video telecommunication system which permits real time communications between the patient (at the originating site) and provider (at the distant site) was utilized to provide this telehealth service.   Verbal consent was requested and obtained from Todd Pearce on this date, 04/23/25 for a telehealth visit and the patient's location was confirmed at the time of the visit.    HPI:  64 y.o. yo male patient complains of erectile dysfunction    Last Visit: 11/12/24  -start intra cavernosal injections with mix 9 at home, starting at 10 units -   - Doppler completed today  - Continue Cialis 5 mg   Fu in 3 months    Today's Visit    #ED  - did not start ICI mix 9 because missed call from pharmacy (does not  unknown numbers)  -Cialis 5mg not working anymore    Labs:  Testosterone, Free 2/25/24: 11.3, total 471  Magnesium  1.7 - 2.3 mg/dL 2.2     CBC: 4/8/24  Component  Ref Range & Units 6 mo ago   WBC  3.70 - 11.00 k/uL 9.36   RBC  4.20 - 6.00 m/uL 5.12   Hemoglobin  13.0 - 17.0 g/dL 16.0   Hematocrit  39.0 - 51.0 % 47.4   MCV  80.0 - 100.0 fL 92.6   MCH  26.0 - 34.0 pg 31.3   MCHC  30.5 - 36.0 g/dL 33.8   RDW-CV  11.5 - 15.0 % 12.5   Platelet Count  150 - 400 k/uL 315   MPV  9.0 - 12.7 fL 9.0   Neutrophils %  % 64.1   Abs Neut  1.45 - 7.50 k/uL 6.00   Lymphocytes %  % 23.9   Abs Lymph  1.00 - 4.00 k/uL 2.24   Monocytes %  % 10.3   Abs Mono  <0.87 k/uL 0.96 High    Eosinophils %  % 1.2   Abs Eosin  <0.46 k/uL 0.11   Basophils %  % 0.2   Abs Baso  <0.11 k/uL <0.03   Immature Granulocytes %  % 0.3   Abs Immature Gran  <0.10 k/uL 0.03   NRBC  /100 WBC 0.0   Absolute nRBC  <0.01 k/uL <0.01     No results found for: \"PROLACTIN\"  Lab Results   Component Value Date    HGBA1C 5.3 11/12/2024     CMP: 4/8/24  Component  Ref Range & Units 6 mo ago   Protein, Total  6.3 - 8.0 g/dL 7.6   Albumin  3.9 - 4.9 g/dL 4.3   Calcium, Total  8.5 - 10.2 mg/dL 9.0   Bilirubin, " Total  0.2 - 1.3 mg/dL 0.6   Alkaline Phosphatase  38 - 113 U/L 86   AST  14 - 40 U/L 21   ALT  10 - 54 U/L 21   Glucose  74 - 99 mg/dL 88     BUN  9 - 24 mg/dL 20   Creatinine  0.73 - 1.22 mg/dL 1.08   Sodium  136 - 144 mmol/L 137   Potassium  3.7 - 5.1 mmol/L 3.7   Chloride  97 - 105 mmol/L 103   CO2  22 - 30 mmol/L 23   Anion Gap  9 - 18 mmol/L 11   Estimated Glomerular Filtration Rate  >=60 mL/min/1.73m² 77     Cholesterol, Total  <200 mg/dL 154      Triglyceride  <150 mg/dL 55      HDL Cholesterol  >39 mg/dL 43      Non HDL Cholesterol  <130 mg/dL 111      Fasting Time  hrs 15   VLDL Cholesterol  <30 mg/dL 11   TC:HDL Ratio  <5.10 3.58   LDL Cholesterol  <100 mg/dL 100 High       LDL:HDL Ratio  <2.54 2.33      in high school at [employer]    PMH:  No past medical history on file.     PSH:  Past Surgical History:   Procedure Laterality Date    INCISION AND DRAINAGE OF WOUND  11/27/2017    Incision And Drainage Of Skin Abscess    KNEE SURGERY      OTHER SURGICAL HISTORY  11/27/2017    Shoulder Surgery Left    SHOULDER SURGERY  11/27/2017    Shoulder Surgery Right        Medications:    Current Outpatient Medications:     amphetamine-dextroamphetamine (Adderall) 20 mg tablet, Take 1 tablet (20 mg) by mouth once daily. Taking 10 mg daily, Disp: , Rfl:     buPROPion XL (Wellbutrin XL) 150 mg 24 hr tablet, Take 1 tablet (150 mg) by mouth once daily in the morning., Disp: , Rfl:     cholecalciferol, vitamin D3, 250 mcg (10,000 unit) capsule, Take 1 capsule (250 mcg) by mouth early in the morning.., Disp: , Rfl:     furosemide (Lasix) 20 mg tablet, Take 1 tablet (20 mg) by mouth once daily., Disp: , Rfl:     gabapentin (Neurontin) 300 mg capsule, TAKE ONE CAPSULE BY MOUTH 1 hour before BEDTIME, Disp: , Rfl:     levothyroxine (Synthroid, Levoxyl) 200 mcg tablet, Take 1 tablet (200 mcg) by mouth once daily., Disp: , Rfl:     meloxicam (Mobic) 15 mg tablet, Take 1 tablet (15 mg) by mouth once daily., Disp: , Rfl:      metFORMIN (Glucophage) 500 mg tablet, Take 1 tablet (500 mg) by mouth once daily., Disp: , Rfl:     methylPREDNISolone (Medrol Dospak) 4 mg tablets, TAKE BY MOUTH AS INSTRUCTED PER PACKAGE, Disp: , Rfl:     metOLazone (Zaroxolyn) 5 mg tablet, Take 1 tablet (5 mg) by mouth once daily., Disp: , Rfl:     metoprolol tartrate (Lopressor) 25 mg tablet, Take 1 tablet (25 mg) by mouth twice a day. Taking just 25 mg once daily, Disp: , Rfl:     omeprazole (PriLOSEC) 20 mg DR capsule, Take 1 capsule (20 mg) by mouth once daily., Disp: , Rfl:     pramipexole (Mirapex) 1 mg tablet, Take 2 tablets (2 mg) by mouth once daily at bedtime., Disp: , Rfl:     tadalafil (Cialis) 5 mg tablet, Take 1 tablet (5 mg) by mouth once daily., Disp: , Rfl:     tirzepatide, weight loss, (Zepbound) 2.5 mg/0.5 mL injection, Inject 2.5 mg under the skin once a week., Disp: , Rfl:     Allergy:  No Known Allergies     Exam  CONSTITUTIONAL:        No acute distress    HEAD:        Normocephalic and atraumatic    CHEST / RESPIRATORY      no excess work of breathing, no respiratory distress,    ABDOMEN / GASTROINTESTINAL:        Abdomen nondistended        Assessment/Plan  #Erectile Dysfunction:   -gave patient pharmacy phone number again and how to obtain medicine  -reconfirmed prescription  -continue cialis 5mg daily  -start intra cavernosal injections with mix 9 at home, starting at 10 units , can titrate up and down 5 units as needed for duration and rigidity    Fu in 3 months    Scribe Attestation  By signing my name below, IErin , Francine   attest that this documentation has been prepared under the direction and in the presence of Delfin Bell MD.    Scribe Attestation  By signing my name below, IElin, Scribosmin   attest that this documentation has been prepared under the direction and in the presence of Delfin Bell MD.

## 2025-04-23 ENCOUNTER — TELEMEDICINE (OUTPATIENT)
Dept: UROLOGY | Facility: CLINIC | Age: 65
End: 2025-04-23
Payer: COMMERCIAL

## 2025-04-23 DIAGNOSIS — N52.9 ERECTILE DYSFUNCTION, UNSPECIFIED ERECTILE DYSFUNCTION TYPE: Primary | ICD-10-CM

## 2025-04-23 PROCEDURE — 99214 OFFICE O/P EST MOD 30 MIN: CPT | Performed by: UROLOGY

## 2025-04-29 ENCOUNTER — TELEPHONE (OUTPATIENT)
Dept: UROLOGY | Facility: CLINIC | Age: 65
End: 2025-04-29
Payer: COMMERCIAL

## 2025-04-29 NOTE — TELEPHONE ENCOUNTER
Call returned to patient, [facility] pharmacy name and phone number provided to patient. Patient voices understanding.

## 2025-07-10 DIAGNOSIS — Z12.11 SPECIAL SCREENING FOR MALIGNANT NEOPLASMS, COLON: ICD-10-CM

## 2025-07-10 RX ORDER — SODIUM, POTASSIUM,MAG SULFATES 17.5-3.13G
SOLUTION, RECONSTITUTED, ORAL ORAL
Qty: 1 EACH | Refills: 0 | Status: SHIPPED | OUTPATIENT
Start: 2025-07-10

## 2025-07-24 ENCOUNTER — APPOINTMENT (OUTPATIENT)
Dept: GASTROENTEROLOGY | Facility: EXTERNAL LOCATION | Age: 65
End: 2025-07-24
Payer: COMMERCIAL

## 2025-07-24 DIAGNOSIS — D12.0 BENIGN NEOPLASM OF CECUM: ICD-10-CM

## 2025-07-24 DIAGNOSIS — K62.5 HEMORRHAGE OF RECTUM AND ANUS: Primary | ICD-10-CM

## 2025-07-24 DIAGNOSIS — K62.5 HEMORRHAGE OF RECTUM AND ANUS: ICD-10-CM

## 2025-07-24 PROCEDURE — 88305 TISSUE EXAM BY PATHOLOGIST: CPT

## 2025-07-24 PROCEDURE — 88305 TISSUE EXAM BY PATHOLOGIST: CPT | Performed by: PATHOLOGY

## 2025-07-24 PROCEDURE — 45385 COLONOSCOPY W/LESION REMOVAL: CPT | Performed by: INTERNAL MEDICINE

## 2025-07-24 PROCEDURE — 0753T DGTZ GLS MCRSCP SLD LEVEL IV: CPT

## 2025-07-24 NOTE — PROGRESS NOTES
Patient had colonoscopy today for rectal bleeding.  This showed 3 small polyps and hemorrhoids.  I recommend Metamucil daily long-term and repeat exam in 3 to 5 years based on the pathology.

## 2025-07-25 ENCOUNTER — LAB REQUISITION (OUTPATIENT)
Dept: LAB | Facility: HOSPITAL | Age: 65
End: 2025-07-25
Payer: COMMERCIAL

## 2025-07-25 DIAGNOSIS — K62.5 HEMORRHAGE OF ANUS AND RECTUM: ICD-10-CM

## 2025-08-01 LAB
LABORATORY COMMENT REPORT: NORMAL
PATH REPORT.COMMENTS IMP SPEC: NORMAL
PATH REPORT.FINAL DX SPEC: NORMAL
PATH REPORT.GROSS SPEC: NORMAL
PATH REPORT.RELEVANT HX SPEC: NORMAL
PATH REPORT.TOTAL CANCER: NORMAL

## 2025-09-03 ENCOUNTER — HOSPITAL ENCOUNTER (OUTPATIENT)
Dept: RADIOLOGY | Facility: HOSPITAL | Age: 65
Discharge: HOME | End: 2025-09-03
Payer: COMMERCIAL

## 2025-09-03 ENCOUNTER — OFFICE VISIT (OUTPATIENT)
Dept: ORTHOPEDIC SURGERY | Facility: HOSPITAL | Age: 65
End: 2025-09-03
Payer: COMMERCIAL

## 2025-09-03 DIAGNOSIS — M76.891 HIP ABDUCTOR TENDONITIS, RIGHT: Primary | ICD-10-CM

## 2025-09-03 DIAGNOSIS — M25.551 HIP PAIN, RIGHT: ICD-10-CM

## 2025-09-03 PROCEDURE — 99203 OFFICE O/P NEW LOW 30 MIN: CPT | Performed by: FAMILY MEDICINE

## 2025-09-03 PROCEDURE — 73502 X-RAY EXAM HIP UNI 2-3 VIEWS: CPT | Mod: RT

## 2025-09-03 PROCEDURE — 99212 OFFICE O/P EST SF 10 MIN: CPT | Performed by: FAMILY MEDICINE

## 2025-09-03 PROCEDURE — 1125F AMNT PAIN NOTED PAIN PRSNT: CPT | Performed by: FAMILY MEDICINE

## 2025-09-03 ASSESSMENT — PAIN SCALES - GENERAL: PAINLEVEL_OUTOF10: 7

## 2025-09-03 ASSESSMENT — PAIN - FUNCTIONAL ASSESSMENT: PAIN_FUNCTIONAL_ASSESSMENT: 0-10
